# Patient Record
Sex: FEMALE | Race: WHITE | NOT HISPANIC OR LATINO | URBAN - METROPOLITAN AREA
[De-identification: names, ages, dates, MRNs, and addresses within clinical notes are randomized per-mention and may not be internally consistent; named-entity substitution may affect disease eponyms.]

---

## 2017-03-02 ENCOUNTER — OUTPATIENT (OUTPATIENT)
Dept: OUTPATIENT SERVICES | Facility: HOSPITAL | Age: 62
LOS: 1 days | End: 2017-03-02
Payer: COMMERCIAL

## 2017-03-02 DIAGNOSIS — R26.89 OTHER ABNORMALITIES OF GAIT AND MOBILITY: ICD-10-CM

## 2017-03-02 DIAGNOSIS — Z96.7 PRESENCE OF OTHER BONE AND TENDON IMPLANTS: Chronic | ICD-10-CM

## 2017-03-02 DIAGNOSIS — Z51.89 ENCOUNTER FOR OTHER SPECIFIED AFTERCARE: ICD-10-CM

## 2017-03-02 DIAGNOSIS — Z95.4 PRESENCE OF OTHER HEART-VALVE REPLACEMENT: Chronic | ICD-10-CM

## 2017-03-02 DIAGNOSIS — I69.354 HEMIPLEGIA AND HEMIPARESIS FOLLOWING CEREBRAL INFARCTION AFFECTING LEFT NON-DOMINANT SIDE: ICD-10-CM

## 2017-03-13 PROCEDURE — 97110 THERAPEUTIC EXERCISES: CPT

## 2017-03-13 PROCEDURE — 97116 GAIT TRAINING THERAPY: CPT

## 2017-03-13 PROCEDURE — 97530 THERAPEUTIC ACTIVITIES: CPT

## 2017-03-13 PROCEDURE — 97112 NEUROMUSCULAR REEDUCATION: CPT

## 2017-03-13 PROCEDURE — 97163 PT EVAL HIGH COMPLEX 45 MIN: CPT

## 2017-07-27 ENCOUNTER — APPOINTMENT (OUTPATIENT)
Dept: PULMONOLOGY | Facility: CLINIC | Age: 62
End: 2017-07-27
Payer: MEDICARE

## 2017-07-27 VITALS
SYSTOLIC BLOOD PRESSURE: 108 MMHG | RESPIRATION RATE: 16 BRPM | DIASTOLIC BLOOD PRESSURE: 72 MMHG | HEART RATE: 72 BPM | OXYGEN SATURATION: 93 %

## 2017-07-27 DIAGNOSIS — Z02.82 ENCOUNTER FOR ADOPTION SERVICES: ICD-10-CM

## 2017-07-27 DIAGNOSIS — Z86.79 PERSONAL HISTORY OF OTHER DISEASES OF THE CIRCULATORY SYSTEM: ICD-10-CM

## 2017-07-27 DIAGNOSIS — Z87.2 PERSONAL HISTORY OF DISEASES OF THE SKIN AND SUBCUTANEOUS TISSUE: ICD-10-CM

## 2017-07-27 DIAGNOSIS — Z86.73 PERSONAL HISTORY OF TRANSIENT ISCHEMIC ATTACK (TIA), AND CEREBRAL INFARCTION W/OUT RESIDUAL DEFICITS: ICD-10-CM

## 2017-07-27 DIAGNOSIS — Z86.59 PERSONAL HISTORY OF OTHER MENTAL AND BEHAVIORAL DISORDERS: ICD-10-CM

## 2017-07-27 DIAGNOSIS — Z87.891 PERSONAL HISTORY OF NICOTINE DEPENDENCE: ICD-10-CM

## 2017-07-27 DIAGNOSIS — G47.33 OBSTRUCTIVE SLEEP APNEA (ADULT) (PEDIATRIC): ICD-10-CM

## 2017-07-27 DIAGNOSIS — Z86.39 PERSONAL HISTORY OF OTHER ENDOCRINE, NUTRITIONAL AND METABOLIC DISEASE: ICD-10-CM

## 2017-07-27 PROCEDURE — 99204 OFFICE O/P NEW MOD 45 MIN: CPT

## 2017-07-27 RX ORDER — CITALOPRAM 10 MG/1
10 TABLET, FILM COATED ORAL
Refills: 0 | Status: ACTIVE | COMMUNITY

## 2017-07-27 RX ORDER — BUPROPION HYDROCHLORIDE 300 MG/1
300 TABLET, EXTENDED RELEASE ORAL
Refills: 0 | Status: ACTIVE | COMMUNITY

## 2017-08-15 ENCOUNTER — RECORD ABSTRACTING (OUTPATIENT)
Age: 62
End: 2017-08-15

## 2017-08-15 DIAGNOSIS — R53.1 WEAKNESS: ICD-10-CM

## 2017-08-15 DIAGNOSIS — R41.3 OTHER AMNESIA: ICD-10-CM

## 2017-08-29 ENCOUNTER — APPOINTMENT (OUTPATIENT)
Dept: NEUROLOGY | Facility: CLINIC | Age: 62
End: 2017-08-29
Payer: COMMERCIAL

## 2017-08-29 VITALS — WEIGHT: 195 LBS | HEIGHT: 66 IN | BODY MASS INDEX: 31.34 KG/M2

## 2017-08-29 VITALS
HEIGHT: 60 IN | SYSTOLIC BLOOD PRESSURE: 106 MMHG | BODY MASS INDEX: 28.27 KG/M2 | DIASTOLIC BLOOD PRESSURE: 67 MMHG | HEART RATE: 82 BPM | WEIGHT: 144 LBS

## 2017-08-29 PROCEDURE — 99213 OFFICE O/P EST LOW 20 MIN: CPT

## 2018-01-10 ENCOUNTER — APPOINTMENT (OUTPATIENT)
Dept: INTERNAL MEDICINE | Facility: CLINIC | Age: 63
End: 2018-01-10
Payer: COMMERCIAL

## 2018-01-10 PROCEDURE — 99203 OFFICE O/P NEW LOW 30 MIN: CPT

## 2018-04-06 ENCOUNTER — APPOINTMENT (OUTPATIENT)
Dept: NEUROLOGY | Facility: CLINIC | Age: 63
End: 2018-04-06
Payer: COMMERCIAL

## 2018-04-06 VITALS — HEIGHT: 66 IN | WEIGHT: 190 LBS | BODY MASS INDEX: 30.53 KG/M2

## 2018-04-06 PROCEDURE — 99214 OFFICE O/P EST MOD 30 MIN: CPT

## 2018-04-06 RX ORDER — DONEPEZIL HYDROCHLORIDE 5 MG/1
5 TABLET ORAL
Refills: 0 | Status: ACTIVE | COMMUNITY

## 2018-05-22 ENCOUNTER — APPOINTMENT (OUTPATIENT)
Dept: NEUROLOGY | Facility: CLINIC | Age: 63
End: 2018-05-22
Payer: COMMERCIAL

## 2018-05-22 VITALS
HEIGHT: 66 IN | WEIGHT: 180 LBS | BODY MASS INDEX: 28.93 KG/M2 | DIASTOLIC BLOOD PRESSURE: 74 MMHG | SYSTOLIC BLOOD PRESSURE: 122 MMHG

## 2018-05-22 PROCEDURE — 99214 OFFICE O/P EST MOD 30 MIN: CPT

## 2018-07-26 PROBLEM — Z86.73 HISTORY OF STROKE: Status: RESOLVED | Noted: 2017-07-27 | Resolved: 2018-07-26

## 2018-07-30 PROBLEM — Z87.2 HISTORY OF DECUBITUS ULCER: Status: RESOLVED | Noted: 2017-07-27 | Resolved: 2018-07-30

## 2018-08-21 ENCOUNTER — APPOINTMENT (OUTPATIENT)
Dept: NEUROLOGY | Facility: CLINIC | Age: 63
End: 2018-08-21
Payer: COMMERCIAL

## 2018-08-21 VITALS
SYSTOLIC BLOOD PRESSURE: 130 MMHG | WEIGHT: 190 LBS | DIASTOLIC BLOOD PRESSURE: 72 MMHG | BODY MASS INDEX: 30.53 KG/M2 | HEIGHT: 66 IN

## 2018-08-21 PROCEDURE — 99213 OFFICE O/P EST LOW 20 MIN: CPT

## 2018-08-21 RX ORDER — ISOSORBIDE MONONITRATE 120 MG/1
120 TABLET, EXTENDED RELEASE ORAL
Refills: 0 | Status: ACTIVE | COMMUNITY

## 2018-08-21 RX ORDER — LABETALOL HYDROCHLORIDE 300 MG/1
300 TABLET, FILM COATED ORAL
Refills: 0 | Status: ACTIVE | COMMUNITY

## 2018-08-21 RX ORDER — AMLODIPINE BESYLATE 2.5 MG/1
2.5 TABLET ORAL
Refills: 0 | Status: ACTIVE | COMMUNITY

## 2018-08-21 RX ORDER — LISINOPRIL 20 MG/1
20 TABLET ORAL
Refills: 0 | Status: ACTIVE | COMMUNITY

## 2018-08-21 RX ORDER — VALSARTAN 320 MG/1
320 TABLET, COATED ORAL
Refills: 0 | Status: ACTIVE | COMMUNITY

## 2019-03-26 ENCOUNTER — APPOINTMENT (OUTPATIENT)
Dept: NEUROLOGY | Facility: CLINIC | Age: 64
End: 2019-03-26
Payer: MEDICARE

## 2019-03-26 VITALS
HEIGHT: 66 IN | SYSTOLIC BLOOD PRESSURE: 116 MMHG | WEIGHT: 200 LBS | DIASTOLIC BLOOD PRESSURE: 80 MMHG | BODY MASS INDEX: 32.14 KG/M2

## 2019-03-26 PROCEDURE — 99213 OFFICE O/P EST LOW 20 MIN: CPT

## 2019-03-26 RX ORDER — RANITIDINE HYDROCHLORIDE 150 MG/1
150 TABLET, FILM COATED ORAL
Refills: 0 | Status: DISCONTINUED | COMMUNITY
End: 2019-03-26

## 2019-03-26 RX ORDER — ASPIRIN 325 MG/1
325 TABLET, FILM COATED ORAL
Refills: 0 | Status: DISCONTINUED | COMMUNITY
End: 2019-03-26

## 2019-03-26 RX ORDER — CLOPIDOGREL 75 MG/1
75 TABLET, FILM COATED ORAL
Refills: 0 | Status: DISCONTINUED | COMMUNITY
End: 2019-03-26

## 2019-03-26 RX ORDER — ASPIRIN 81 MG
81 TABLET, DELAYED RELEASE (ENTERIC COATED) ORAL
Refills: 0 | Status: DISCONTINUED | COMMUNITY
End: 2019-03-26

## 2019-03-26 NOTE — CONSULT LETTER
[Dear  ___] : Dear  [unfilled], [Courtesy Letter:] : I had the pleasure of seeing your patient, [unfilled], in my office today. [Please see my note below.] : Please see my note below. [Consult Closing:] : Thank you very much for allowing me to participate in the care of this patient.  If you have any questions, please do not hesitate to contact me. [Sincerely,] : Sincerely, [FreeTextEntry3] : Deep Torres M.D., Ph.D. DPN-N\par [facility]\par Neurology at [place]\par Medical Director of Stroke Services\par [facility]\par

## 2019-03-26 NOTE — PHYSICAL EXAM
[Person] : oriented to person [Place] : oriented to place [Time] : oriented to time [Remote Intact] : remote memory intact [Registration Intact] : recent registration memory intact [Span Intact] : the attention span was normal [Concentration Intact] : normal concentrating ability [Visual Intact] : visual attention was ~T not ~L decreased [Naming Objects] : no difficulty naming common objects [Repeating Phrases] : no difficulty repeating a phrase [Fluency] : fluency intact [Comprehension] : comprehension intact [Current Events] : adequate knowledge of current events [Past History] : adequate knowledge of personal past history [Cranial Nerves Optic (II)] : visual acuity intact bilaterally,  visual fields full to confrontation, pupils equal round and reactive to light [Cranial Nerves Oculomotor (III)] : extraocular motion intact [Cranial Nerves Trigeminal (V)] : facial sensation intact symmetrically [Cranial Nerves Facial (VII)] : face symmetrical [Cranial Nerves Vestibulocochlear (VIII)] : hearing was intact bilaterally [Cranial Nerves Glossopharyngeal (IX)] : tongue and palate midline [Cranial Nerves Accessory (XI - Cranial And Spinal)] : head turning and shoulder shrug symmetric [Cranial Nerves Hypoglossal (XII)] : there was no tongue deviation with protrusion [Cranial Nerves Facial Central - Left Only] : central 7th nerve weakness [Cranial Nerves Left Facial: House Grade ___(1-6)] : grade II (slight, 80%) facial nerve function [Motor Tone] : muscle tone was normal in all four extremities [Involuntary Movements] : no involuntary movements were seen [No Muscle Atrophy] : normal bulk in all four extremities [Motor Handedness Left-Handed] : the patient is left hand dominant [Hemiparesis Of Left Side] : hemiparesis was present on the left [Motor Strength Upper Extremities Right] : strength was normal in the right upper extremity [Motor Strength Upper Extremities Left] : there was weakness of the left upper extremity [Motor Strength Lower Extremities Right] : strength was normal in the right lower extremity [Motor Strength Lower Extremities Left] : there was weakness of the left lower extremity [Sensation Tactile Decrease] : light touch was intact [Sensation Pain / Temperature Decrease] : pain and temperature was intact [Sensation Vibration Decrease] : vibration was intact [Proprioception] : proprioception was intact [Non-ambulatory] : Non-ambulatory [Tremor] : no tremor present [Coordination - Dysmetria Impaired Finger-to-Nose Bilateral] : not present [1+] : Patella left 1+ [FreeTextEntry5] : dysarthria [FreeTextEntry6] : mild left alex-paresis arm greater than leg [FreeTextEntry8] : IN Wheelchair [Sclera] : the sclera and conjunctiva were normal [PERRL With Normal Accommodation] : pupils were equal in size, round, reactive to light, with normal accommodation [Extraocular Movements] : extraocular movements were intact [No APD] : no afferent pupillary defect [No KRISTIAN] : no internuclear ophthalmoplegia [Full Visual Field] : full visual field

## 2019-03-26 NOTE — HISTORY OF PRESENT ILLNESS
[FreeTextEntry1] : 8/29/17;\par This is a 62-year-old left-handed woman who comes in today for followup of stroke. This stroke left her with some left-sided weakness and expressive aphasia. She's not been to physical therapy for several months now. She generally is nonambulatory in her home but able to transfer from chair to wheelchair to couch. She is here today expressing renewed interested in doing physical therapy to become stronger. She is hoping to be able to drive again.\par \par Followup April 6, 2018:\par This is a 62-year-old woman returns for followup of stroke. She had a stroke in the past which left her with left-sided weakness mildly aphasia as well as dysarthria. Pressures in the hospital about 2 weeks ago and had another stroke seen on MRI in the posterior right frontal lobe. Her Lipitor was increased from 20 mg to 80 mg a day. She continued on antiplatelet agents. She states her blood pressure is under adequate control. She is here today for neurologic followup.\par \par Followup May 22, 2018:\par This is a 62-year-old woman who returns for followup of stroke. She has left hemiparesis and dysarthria as a result of her stroke. She can ambulate with a walker in the home but uses a wheelchair outside of the home. In March she had recurrent stroke. She apparently had question of PFO and was supposed to have a transesophageal echocardiogram. Currently she is not wish to have this test as she would not have surgery it was necessary based upon the results of the test. She is here today for neurologic followup and continues on medicine for blood pressure control, or hyperlipidemia control as well his antiplatelet agents for stroke.\par \par Followup August 21, 2018:\par This is a 63-year-old woman returns today for followup of her stroke. She has residual dysarthria and left hemiparesthesias. She is unchanged from stroke point of view. She has been a bit more impulsive after the sudden death of her sister. Other than that she's had no new issues. She is here today for routine neurologic followup.\par \par Followup March 26, 2019:\par This is a 63-year-old woman who presents today for followup of stroke. She has left-sided weakness and dysarthria. This is currently at her baseline. She is here with her brother who typically accompanies her to the office visits. There has not been any significant change in her neurologic or medical status since her last visit. She is here today for routine neurologic followup.

## 2019-03-26 NOTE — ASSESSMENT
[FreeTextEntry1] : This is a 63-year-old woman with history of stroke. She should continue on aspirin and blood pressure control. I will give her another prescription for physical therapy to see if she is a bit stronger. I will follow with her in 6 months, sooner should the need arise.

## 2019-04-09 ENCOUNTER — OUTPATIENT (OUTPATIENT)
Dept: OUTPATIENT SERVICES | Facility: HOSPITAL | Age: 64
LOS: 1 days | End: 2019-04-09
Payer: MEDICARE

## 2019-04-09 DIAGNOSIS — Z51.89 ENCOUNTER FOR OTHER SPECIFIED AFTERCARE: ICD-10-CM

## 2019-04-09 DIAGNOSIS — R27.9 UNSPECIFIED LACK OF COORDINATION: ICD-10-CM

## 2019-04-09 DIAGNOSIS — Z95.4 PRESENCE OF OTHER HEART-VALVE REPLACEMENT: Chronic | ICD-10-CM

## 2019-04-09 DIAGNOSIS — Z96.7 PRESENCE OF OTHER BONE AND TENDON IMPLANTS: Chronic | ICD-10-CM

## 2019-04-09 DIAGNOSIS — I63.9 CEREBRAL INFARCTION, UNSPECIFIED: ICD-10-CM

## 2019-04-09 DIAGNOSIS — R26.89 OTHER ABNORMALITIES OF GAIT AND MOBILITY: ICD-10-CM

## 2019-06-19 PROCEDURE — 97116 GAIT TRAINING THERAPY: CPT

## 2019-06-19 PROCEDURE — 97112 NEUROMUSCULAR REEDUCATION: CPT

## 2019-06-19 PROCEDURE — 97110 THERAPEUTIC EXERCISES: CPT

## 2019-06-19 PROCEDURE — 97163 PT EVAL HIGH COMPLEX 45 MIN: CPT

## 2019-10-01 ENCOUNTER — APPOINTMENT (OUTPATIENT)
Dept: NEUROLOGY | Facility: CLINIC | Age: 64
End: 2019-10-01
Payer: MEDICARE

## 2019-10-01 VITALS
BODY MASS INDEX: 30.7 KG/M2 | DIASTOLIC BLOOD PRESSURE: 70 MMHG | SYSTOLIC BLOOD PRESSURE: 120 MMHG | WEIGHT: 191 LBS | HEIGHT: 66 IN

## 2019-10-01 PROCEDURE — 99213 OFFICE O/P EST LOW 20 MIN: CPT

## 2019-10-01 NOTE — ASSESSMENT
[FreeTextEntry1] : This is a 64-year-old woman with stroke and residual left-sided weakness and dysarthria. She is having issues with the fourth and fifth digits of her left hand but is not realize it. This may be neglect. This may be a peripheral injury. I would like to do a EMG nerve conduction study to identify whether this is an ulnar neuropathy or cervical radiculopathy is not a likely is coming from the brain and her previous stroke. At this time however she is not want to undergo this test. I will see her back in 6 months, sooner should the need arise.

## 2019-10-01 NOTE — HISTORY OF PRESENT ILLNESS
[FreeTextEntry1] : 8/29/17;\par This is a 62-year-old left-handed woman who comes in today for followup of stroke. This stroke left her with some left-sided weakness and expressive aphasia. She's not been to physical therapy for several months now. She generally is nonambulatory in her home but able to transfer from chair to wheelchair to couch. She is here today expressing renewed interested in doing physical therapy to become stronger. She is hoping to be able to drive again.\par \par Followup April 6, 2018:\par This is a 62-year-old woman returns for followup of stroke. She had a stroke in the past which left her with left-sided weakness mildly aphasia as well as dysarthria. Pressures in the hospital about 2 weeks ago and had another stroke seen on MRI in the posterior right frontal lobe. Her Lipitor was increased from 20 mg to 80 mg a day. She continued on antiplatelet agents. She states her blood pressure is under adequate control. She is here today for neurologic followup.\par \par Followup May 22, 2018:\par This is a 62-year-old woman who returns for followup of stroke. She has left hemiparesis and dysarthria as a result of her stroke. She can ambulate with a walker in the home but uses a wheelchair outside of the home. In March she had recurrent stroke. She apparently had question of PFO and was supposed to have a transesophageal echocardiogram. Currently she is not wish to have this test as she would not have surgery it was necessary based upon the results of the test. She is here today for neurologic followup and continues on medicine for blood pressure control, or hyperlipidemia control as well his antiplatelet agents for stroke.\par \par Followup August 21, 2018:\par This is a 63-year-old woman returns today for followup of her stroke. She has residual dysarthria and left hemiparesthesias. She is unchanged from stroke point of view. She has been a bit more impulsive after the sudden death of her sister. Other than that she's had no new issues. She is here today for routine neurologic followup.\par \par Followup March 26, 2019:\par This is a 63-year-old woman who presents today for followup of stroke. She has left-sided weakness and dysarthria. This is currently at her baseline. She is here with her brother who typically accompanies her to the office visits. There has not been any significant change in her neurologic or medical status since her last visit. She is here today for routine neurologic followup.\par \par Followup October 1, 2019:\par This is a 63-year-old woman who presents today for followup of stroke. She has residual left-sided weakness and dysarthria. She is also having what appears to be sensory loss of the fourth and fifth digits of the left hand. Her brother is concerned because sometimes her rings will turn around and if she goes to grab something to stones will begin her hand. She did not feel it. She is otherwise unchanged from her previous visit. She is here today for neurologic followup.

## 2019-10-01 NOTE — CONSULT LETTER
[Dear  ___] : Dear  [unfilled], [Courtesy Letter:] : I had the pleasure of seeing your patient, [unfilled], in my office today. [Consult Closing:] : Thank you very much for allowing me to participate in the care of this patient.  If you have any questions, please do not hesitate to contact me. [Please see my note below.] : Please see my note below. [Sincerely,] : Sincerely, [FreeTextEntry3] : Deep Torres M.D., Ph.D. DPN-N\par [facility]\par Neurology at [place]\par Medical Director of Stroke Services\par [facility]\par

## 2019-10-01 NOTE — PHYSICAL EXAM
[Person] : oriented to person [Place] : oriented to place [Time] : oriented to time [Remote Intact] : remote memory intact [Registration Intact] : recent registration memory intact [Concentration Intact] : normal concentrating ability [Span Intact] : the attention span was normal [Naming Objects] : no difficulty naming common objects [Visual Intact] : visual attention was ~T not ~L decreased [Repeating Phrases] : no difficulty repeating a phrase [Fluency] : fluency intact [Comprehension] : comprehension intact [Past History] : adequate knowledge of personal past history [Current Events] : adequate knowledge of current events [Cranial Nerves Optic (II)] : visual acuity intact bilaterally,  visual fields full to confrontation, pupils equal round and reactive to light [Cranial Nerves Trigeminal (V)] : facial sensation intact symmetrically [Cranial Nerves Oculomotor (III)] : extraocular motion intact [Cranial Nerves Vestibulocochlear (VIII)] : hearing was intact bilaterally [Cranial Nerves Glossopharyngeal (IX)] : tongue and palate midline [Cranial Nerves Hypoglossal (XII)] : there was no tongue deviation with protrusion [Cranial Nerves Accessory (XI - Cranial And Spinal)] : head turning and shoulder shrug symmetric [Cranial Nerves Facial Central - Left Only] : central 7th nerve weakness [Cranial Nerves Left Facial: House Grade ___(1-6)] : grade II (slight, 80%) facial nerve function [Motor Tone] : muscle tone was normal in all four extremities [Involuntary Movements] : no involuntary movements were seen [No Muscle Atrophy] : normal bulk in all four extremities [Motor Handedness Left-Handed] : the patient is left hand dominant [Hemiparesis Of Left Side] : hemiparesis was present on the left [Motor Strength Upper Extremities Right] : strength was normal in the right upper extremity [Motor Strength Upper Extremities Left] : there was weakness of the left upper extremity [Motor Strength Lower Extremities Right] : strength was normal in the right lower extremity [Motor Strength Lower Extremities Left] : there was weakness of the left lower extremity [Sensation Tactile Decrease] : light touch was intact [Sensation Pain / Temperature Decrease] : pain and temperature was intact [Sensation Vibration Decrease] : vibration was intact [Proprioception] : proprioception was intact [Non-ambulatory] : Non-ambulatory [Tremor] : no tremor present [Coordination - Dysmetria Impaired Finger-to-Nose Bilateral] : not present [1+] : Patella right 1+ [FreeTextEntry5] : dysarthria [FreeTextEntry8] : IN Wheelchair [FreeTextEntry6] : mild left alex-paresis arm greater than leg [Sclera] : the sclera and conjunctiva were normal [PERRL With Normal Accommodation] : pupils were equal in size, round, reactive to light, with normal accommodation [No APD] : no afferent pupillary defect [Extraocular Movements] : extraocular movements were intact [No KRISTIAN] : no internuclear ophthalmoplegia [Full Visual Field] : full visual field

## 2020-04-03 ENCOUNTER — APPOINTMENT (OUTPATIENT)
Dept: NEUROLOGY | Facility: CLINIC | Age: 65
End: 2020-04-03
Payer: MEDICARE

## 2020-04-03 PROCEDURE — G2012 BRIEF CHECK IN BY MD/QHP: CPT

## 2021-08-24 ENCOUNTER — NON-APPOINTMENT (OUTPATIENT)
Age: 66
End: 2021-08-24

## 2021-09-22 ENCOUNTER — APPOINTMENT (OUTPATIENT)
Dept: NEUROLOGY | Facility: CLINIC | Age: 66
End: 2021-09-22
Payer: MEDICARE

## 2021-09-22 PROCEDURE — 99214 OFFICE O/P EST MOD 30 MIN: CPT

## 2021-09-22 NOTE — PHYSICAL EXAM
[Person] : oriented to person [Place] : oriented to place [Time] : oriented to time [Short Term Intact] : short term memory intact [Remote Intact] : remote memory intact [Registration Intact] : recent registration memory intact [Span Intact] : the attention span was normal [Concentration Intact] : normal concentrating ability [Visual Intact] : visual attention was ~T not ~L decreased [Naming Objects] : no difficulty naming common objects [Repeating Phrases] : no difficulty repeating a phrase [Fluency] : fluency intact [Comprehension] : comprehension intact [Current Events] : adequate knowledge of current events [Past History] : adequate knowledge of personal past history [Cranial Nerves Optic (II)] : visual acuity intact bilaterally,  visual fields full to confrontation, pupils equal round and reactive to light [Cranial Nerves Oculomotor (III)] : extraocular motion intact [Cranial Nerves Trigeminal (V)] : facial sensation intact symmetrically [Cranial Nerves Vestibulocochlear (VIII)] : hearing was intact bilaterally [Cranial Nerves Glossopharyngeal (IX)] : tongue and palate midline [Cranial Nerves Accessory (XI - Cranial And Spinal)] : head turning and shoulder shrug symmetric [Cranial Nerves Hypoglossal (XII)] : there was no tongue deviation with protrusion [Cranial Nerves Facial Central - Left Only] : central 7th nerve weakness [Cranial Nerves Left Facial: House Grade ___(1-6)] : grade II (slight, 80%) facial nerve function [Motor Tone] : muscle tone was normal in all four extremities [Involuntary Movements] : no involuntary movements were seen [No Muscle Atrophy] : normal bulk in all four extremities [Motor Handedness Left-Handed] : the patient is left hand dominant [Hemiparesis Of Left Side] : hemiparesis was present on the left [Motor Strength Upper Extremities Right] : strength was normal in the right upper extremity [Motor Strength Upper Extremities Left] : there was weakness of the left upper extremity [Motor Strength Lower Extremities Right] : strength was normal in the right lower extremity [Motor Strength Lower Extremities Left] : there was weakness of the left lower extremity [Sensation Tactile Decrease] : light touch was intact [Sensation Pain / Temperature Decrease] : pain and temperature was intact [Sensation Vibration Decrease] : vibration was intact [Proprioception] : proprioception was intact [Non-ambulatory] : Non-ambulatory [Tremor] : no tremor present [Coordination - Dysmetria Impaired Finger-to-Nose Bilateral] : not present [1+] : Patella left 1+ [FreeTextEntry4] : 2/3 recall, 3/3 with prompt [FreeTextEntry5] : dysarthria [FreeTextEntry6] : mild left alex-paresis arm greater than leg [FreeTextEntry8] : IN Wheelchair [Sclera] : the sclera and conjunctiva were normal [PERRL With Normal Accommodation] : pupils were equal in size, round, reactive to light, with normal accommodation [Extraocular Movements] : extraocular movements were intact [No APD] : no afferent pupillary defect [No KRISTIAN] : no internuclear ophthalmoplegia [Full Visual Field] : full visual field

## 2021-09-22 NOTE — ASSESSMENT
[FreeTextEntry1] : This is a 66-year-old woman with history of strokes. She has had recent hospital visit for altered mental status which was likely due to urinary tract infection which is improved. She does have some mild continuing memory loss it's been going on over the past several months. Based on her CT findings this may be early stages of vascular dementia. At this time I would not start any medication. I will see her back in 3 months for reassessment.

## 2021-09-22 NOTE — DATA REVIEWED
[de-identified] : Report of the head CT that was done August 20, 2021 at the [facility]. There is no acute stroke mass or bleed. There was evidence for severe diffuse white matter changes and numerous chronic infarcts.

## 2021-09-22 NOTE — CONSULT LETTER
[Dear  ___] : Dear  [unfilled], [Courtesy Letter:] : I had the pleasure of seeing your patient, [unfilled], in my office today. [Please see my note below.] : Please see my note below. [Consult Closing:] : Thank you very much for allowing me to participate in the care of this patient.  If you have any questions, please do not hesitate to contact me. [Sincerely,] : Sincerely, [FreeTextEntry3] : Deep Torres M.D., Ph.D. DPN-N\par [facility]\par Neurology at [place]\par Medical Director of Stroke Services\par [facility]\par

## 2021-09-22 NOTE — HISTORY OF PRESENT ILLNESS
[FreeTextEntry1] : 8/29/17;\par This is a 62-year-old left-handed woman who comes in today for followup of stroke. This stroke left her with some left-sided weakness and expressive aphasia. She's not been to physical therapy for several months now. She generally is nonambulatory in her home but able to transfer from chair to wheelchair to couch. She is here today expressing renewed interested in doing physical therapy to become stronger. She is hoping to be able to drive again.\par \par Followup April 6, 2018:\par This is a 62-year-old woman returns for followup of stroke. She had a stroke in the past which left her with left-sided weakness mildly aphasia as well as dysarthria. Pressures in the hospital about 2 weeks ago and had another stroke seen on MRI in the posterior right frontal lobe. Her Lipitor was increased from 20 mg to 80 mg a day. She continued on antiplatelet agents. She states her blood pressure is under adequate control. She is here today for neurologic followup.\par \par Followup May 22, 2018:\par This is a 62-year-old woman who returns for followup of stroke. She has left hemiparesis and dysarthria as a result of her stroke. She can ambulate with a walker in the home but uses a wheelchair outside of the home. In March she had recurrent stroke. She apparently had question of PFO and was supposed to have a transesophageal echocardiogram. Currently she is not wish to have this test as she would not have surgery it was necessary based upon the results of the test. She is here today for neurologic followup and continues on medicine for blood pressure control, or hyperlipidemia control as well his antiplatelet agents for stroke.\par \par Followup August 21, 2018:\par This is a 63-year-old woman returns today for followup of her stroke. She has residual dysarthria and left hemiparesthesias. She is unchanged from stroke point of view. She has been a bit more impulsive after the sudden death of her sister. Other than that she's had no new issues. She is here today for routine neurologic followup.\par \par Followup March 26, 2019:\par This is a 63-year-old woman who presents today for followup of stroke. She has left-sided weakness and dysarthria. This is currently at her baseline. She is here with her brother who typically accompanies her to the office visits. There has not been any significant change in her neurologic or medical status since her last visit. She is here today for routine neurologic followup.\par \par Followup October 1, 2019:\par This is a 63-year-old woman who presents today for followup of stroke. She has residual left-sided weakness and dysarthria. She is also having what appears to be sensory loss of the fourth and fifth digits of the left hand. Her brother is concerned because sometimes her rings will turn around and if she goes to grab something to stones will begin her hand. She did not feel it. She is otherwise unchanged from her previous visit. She is here today for neurologic followup.\par \par Followup September 22, 2021:\par This is a 66-year-old woman who presents today for neurologic followup. She's had history of stroke the past his left Her with residualdysarthria and residual left-sided weakness. Last month she had episode of confusion where she couldn't recognize people. The family took her to [facility] where workup revealed no acute findings on head CT and a urinary tract infection which was treated. Her brother states that she's been having a mild short term memory issues. She is no longer experiencing altered mental status. She is here today for neurologic evaluation.

## 2021-12-14 ENCOUNTER — APPOINTMENT (OUTPATIENT)
Dept: NEUROLOGY | Facility: CLINIC | Age: 66
End: 2021-12-14
Payer: MEDICARE

## 2021-12-14 VITALS
BODY MASS INDEX: 32.14 KG/M2 | DIASTOLIC BLOOD PRESSURE: 60 MMHG | SYSTOLIC BLOOD PRESSURE: 90 MMHG | WEIGHT: 200 LBS | HEIGHT: 66 IN

## 2021-12-14 DIAGNOSIS — I10 ESSENTIAL (PRIMARY) HYPERTENSION: ICD-10-CM

## 2021-12-14 PROCEDURE — 99214 OFFICE O/P EST MOD 30 MIN: CPT

## 2021-12-14 NOTE — CONSULT LETTER
[Dear  ___] : Dear  [unfilled], [Courtesy Letter:] : I had the pleasure of seeing your patient, [unfilled], in my office today. [Please see my note below.] : Please see my note below. [Consult Closing:] : Thank you very much for allowing me to participate in the care of this patient.  If you have any questions, please do not hesitate to contact me. [Sincerely,] : Sincerely, [FreeTextEntry3] : Deep Torres M.D., Ph.D. DPN-N\par [facility]\par Neurology at [place]\par Medical Director of Stroke Services\par [facility]\par

## 2021-12-14 NOTE — PHYSICAL EXAM
[Person] : oriented to person [Place] : oriented to place [Time] : oriented to time [Remote Intact] : remote memory intact [Registration Intact] : recent registration memory intact [Span Intact] : the attention span was normal [Concentration Intact] : normal concentrating ability [Visual Intact] : visual attention was ~T not ~L decreased [Naming Objects] : no difficulty naming common objects [Repeating Phrases] : no difficulty repeating a phrase [Fluency] : fluency intact [Comprehension] : comprehension intact [Current Events] : adequate knowledge of current events [Past History] : adequate knowledge of personal past history [Cranial Nerves Optic (II)] : visual acuity intact bilaterally,  visual fields full to confrontation, pupils equal round and reactive to light [Cranial Nerves Oculomotor (III)] : extraocular motion intact [Cranial Nerves Trigeminal (V)] : facial sensation intact symmetrically [Cranial Nerves Vestibulocochlear (VIII)] : hearing was intact bilaterally [Cranial Nerves Glossopharyngeal (IX)] : tongue and palate midline [Cranial Nerves Accessory (XI - Cranial And Spinal)] : head turning and shoulder shrug symmetric [Cranial Nerves Hypoglossal (XII)] : there was no tongue deviation with protrusion [Cranial Nerves Facial Central - Left Only] : central 7th nerve weakness [Cranial Nerves Left Facial: House Grade ___(1-6)] : grade II (slight, 80%) facial nerve function [Motor Tone] : muscle tone was normal in all four extremities [Involuntary Movements] : no involuntary movements were seen [No Muscle Atrophy] : normal bulk in all four extremities [Motor Handedness Left-Handed] : the patient is left hand dominant [Hemiparesis Of Left Side] : hemiparesis was present on the left [Motor Strength Upper Extremities Right] : strength was normal in the right upper extremity [Motor Strength Upper Extremities Left] : there was weakness of the left upper extremity [Motor Strength Lower Extremities Right] : strength was normal in the right lower extremity [Motor Strength Lower Extremities Left] : there was weakness of the left lower extremity [Sensation Tactile Decrease] : light touch was intact [Sensation Pain / Temperature Decrease] : pain and temperature was intact [Sensation Vibration Decrease] : vibration was intact [Proprioception] : proprioception was intact [Non-ambulatory] : Non-ambulatory [Tremor] : no tremor present [Coordination - Dysmetria Impaired Finger-to-Nose Bilateral] : not present [1+] : Patella left 1+ [FreeTextEntry5] : dysarthria [FreeTextEntry6] : mild left alex-paresis arm greater than leg [FreeTextEntry8] : IN Wheelchair [Sclera] : the sclera and conjunctiva were normal [PERRL With Normal Accommodation] : pupils were equal in size, round, reactive to light, with normal accommodation [Extraocular Movements] : extraocular movements were intact [No APD] : no afferent pupillary defect [No KRISTIAN] : no internuclear ophthalmoplegia [Full Visual Field] : full visual field

## 2021-12-14 NOTE — HISTORY OF PRESENT ILLNESS
[FreeTextEntry1] : 8/29/17;\par This is a 62-year-old left-handed woman who comes in today for followup of stroke. This stroke left her with some left-sided weakness and expressive aphasia. She's not been to physical therapy for several months now. She generally is nonambulatory in her home but able to transfer from chair to wheelchair to couch. She is here today expressing renewed interested in doing physical therapy to become stronger. She is hoping to be able to drive again.\par \par Followup April 6, 2018:\par This is a 62-year-old woman returns for followup of stroke. She had a stroke in the past which left her with left-sided weakness mildly aphasia as well as dysarthria. Pressures in the hospital about 2 weeks ago and had another stroke seen on MRI in the posterior right frontal lobe. Her Lipitor was increased from 20 mg to 80 mg a day. She continued on antiplatelet agents. She states her blood pressure is under adequate control. She is here today for neurologic followup.\par \par Followup May 22, 2018:\par This is a 62-year-old woman who returns for followup of stroke. She has left hemiparesis and dysarthria as a result of her stroke. She can ambulate with a walker in the home but uses a wheelchair outside of the home. In March she had recurrent stroke. She apparently had question of PFO and was supposed to have a transesophageal echocardiogram. Currently she is not wish to have this test as she would not have surgery it was necessary based upon the results of the test. She is here today for neurologic followup and continues on medicine for blood pressure control, or hyperlipidemia control as well his antiplatelet agents for stroke.\par \par Followup August 21, 2018:\par This is a 63-year-old woman returns today for followup of her stroke. She has residual dysarthria and left hemiparesthesias. She is unchanged from stroke point of view. She has been a bit more impulsive after the sudden death of her sister. Other than that she's had no new issues. She is here today for routine neurologic followup.\par \par Followup March 26, 2019:\par This is a 63-year-old woman who presents today for followup of stroke. She has left-sided weakness and dysarthria. This is currently at her baseline. She is here with her brother who typically accompanies her to the office visits. There has not been any significant change in her neurologic or medical status since her last visit. She is here today for routine neurologic followup.\par \par Followup October 1, 2019:\par This is a 63-year-old woman who presents today for followup of stroke. She has residual left-sided weakness and dysarthria. She is also having what appears to be sensory loss of the fourth and fifth digits of the left hand. Her brother is concerned because sometimes her rings will turn around and if she goes to grab something to stones will begin her hand. She did not feel it. She is otherwise unchanged from her previous visit. She is here today for neurologic followup.\par \par Followup September 22, 2021:\par This is a 66-year-old woman who presents today for neurologic followup. She's had history of stroke the past his left Her with residual dysarthria and residual left-sided weakness. Last month she had episode of confusion where she couldn't recognize people. The family took her to [facility] where workup revealed no acute findings on head CT and a urinary tract infection which was treated. Her brother states that she's been having a mild short term memory issues. She is no longer experiencing altered mental status. She is here today for neurologic evaluation.\par \par Followup December 14, 2021::\par This is a 66-year-old woman who presents today for neurologic followup. She has a history of stroke that has left her with residual dysarthria. She also has mild left-sided weakness. She at last visit was complaining of memory issues. She finds that this is stable and her brother agrees. She is currently taking a daily aspirin, 325 mg. She is keeping control her blood pressure and cholesterol. She is here today for neurologic followup.

## 2021-12-14 NOTE — ASSESSMENT
[FreeTextEntry1] : This is a 66-year-old woman with history of stroke. At this time she should continue with daily aspirin as well as controlling her blood pressure and cholesterol. She may have early vascular dementia and efforts to event further ischemic injury will also assist with this. I will see her back in 3 months, sooner should the need arise.

## 2021-12-14 NOTE — REVIEW OF SYSTEMS
[As Noted in HPI] : as noted in HPI [Arm Weakness] : no arm weakness [Hand Weakness] : no hand weakness [Leg Weakness] : no leg weakness [Difficulties in Speech] : speech difficulties [Difficulty Walking] : difficulty walking [Negative] : Heme/Lymph

## 2022-03-15 ENCOUNTER — APPOINTMENT (OUTPATIENT)
Dept: NEUROLOGY | Facility: CLINIC | Age: 67
End: 2022-03-15
Payer: MEDICARE

## 2022-03-15 VITALS
HEIGHT: 66 IN | WEIGHT: 195 LBS | BODY MASS INDEX: 31.34 KG/M2 | DIASTOLIC BLOOD PRESSURE: 70 MMHG | SYSTOLIC BLOOD PRESSURE: 110 MMHG

## 2022-03-15 PROCEDURE — 99214 OFFICE O/P EST MOD 30 MIN: CPT

## 2022-03-15 NOTE — ASSESSMENT
[FreeTextEntry1] : This is a 66-year-old woman status post stroke which is left her with some dysarthria and mild left-sided weakness.  From this she is stable.  I would suggest to continue aspirin as well as a keep her blood pressure within normal parameters with medication and keep her LDL under 70 with the Lipitor.  I regarding her memory is fairly stable.  This may represent a vascular dementia and as she addresses for secondary stroke prevention she will also be preventing worsening of her condition.  I will see her back in 6 months, sooner should the need arise.

## 2022-03-15 NOTE — CONSULT LETTER
[Dear  ___] : Dear  [unfilled], [Courtesy Letter:] : I had the pleasure of seeing your patient, [unfilled], in my office today. [Please see my note below.] : Please see my note below. [Consult Closing:] : Thank you very much for allowing me to participate in the care of this patient.  If you have any questions, please do not hesitate to contact me. [Sincerely,] : Sincerely, [FreeTextEntry3] : Deep Torres M.D., Ph.D. DPN-N\par [facility]\par Neurology at [place]\par Medical Director of Stroke Services\par [facility]\par

## 2022-03-15 NOTE — REVIEW OF SYSTEMS
[As Noted in HPI] : as noted in HPI [Memory Lapses or Loss] : memory loss [Difficulties in Speech] : speech difficulties [Negative] : Heme/Lymph

## 2023-01-13 ENCOUNTER — APPOINTMENT (OUTPATIENT)
Dept: NEUROLOGY | Facility: CLINIC | Age: 68
End: 2023-01-13
Payer: MEDICARE

## 2023-01-13 VITALS
SYSTOLIC BLOOD PRESSURE: 114 MMHG | DIASTOLIC BLOOD PRESSURE: 74 MMHG | WEIGHT: 195 LBS | HEIGHT: 66 IN | BODY MASS INDEX: 31.34 KG/M2

## 2023-01-13 DIAGNOSIS — E78.5 HYPERLIPIDEMIA, UNSPECIFIED: ICD-10-CM

## 2023-01-13 PROCEDURE — 99213 OFFICE O/P EST LOW 20 MIN: CPT

## 2023-01-13 NOTE — HISTORY OF PRESENT ILLNESS
[FreeTextEntry1] : 8/29/17;\par This is a 62-year-old left-handed woman who comes in today for followup of stroke. This stroke left her with some left-sided weakness and expressive aphasia. She's not been to physical therapy for several months now. She generally is nonambulatory in her home but able to transfer from chair to wheelchair to couch. She is here today expressing renewed interested in doing physical therapy to become stronger. She is hoping to be able to drive again.\par \par Followup April 6, 2018:\par This is a 62-year-old woman returns for followup of stroke. She had a stroke in the past which left her with left-sided weakness mildly aphasia as well as dysarthria. Pressures in the hospital about 2 weeks ago and had another stroke seen on MRI in the posterior right frontal lobe. Her Lipitor was increased from 20 mg to 80 mg a day. She continued on antiplatelet agents. She states her blood pressure is under adequate control. She is here today for neurologic followup.\par \par Followup May 22, 2018:\par This is a 62-year-old woman who returns for followup of stroke. She has left hemiparesis and dysarthria as a result of her stroke. She can ambulate with a walker in the home but uses a wheelchair outside of the home. In March she had recurrent stroke. She apparently had question of PFO and was supposed to have a transesophageal echocardiogram. Currently she is not wish to have this test as she would not have surgery it was necessary based upon the results of the test. She is here today for neurologic followup and continues on medicine for blood pressure control, or hyperlipidemia control as well his antiplatelet agents for stroke.\par \par Followup August 21, 2018:\par This is a 63-year-old woman returns today for followup of her stroke. She has residual dysarthria and left hemiparesthesias. She is unchanged from stroke point of view. She has been a bit more impulsive after the sudden death of her sister. Other than that she's had no new issues. She is here today for routine neurologic followup.\par \par Followup March 26, 2019:\par This is a 63-year-old woman who presents today for followup of stroke. She has left-sided weakness and dysarthria. This is currently at her baseline. She is here with her brother who typically accompanies her to the office visits. There has not been any significant change in her neurologic or medical status since her last visit. She is here today for routine neurologic followup.\par \par Followup October 1, 2019:\par This is a 63-year-old woman who presents today for followup of stroke. She has residual left-sided weakness and dysarthria. She is also having what appears to be sensory loss of the fourth and fifth digits of the left hand. Her brother is concerned because sometimes her rings will turn around and if she goes to grab something to stones will begin her hand. She did not feel it. She is otherwise unchanged from her previous visit. She is here today for neurologic followup.\par \par Followup September 22, 2021:\par This is a 66-year-old woman who presents today for neurologic followup. She's had history of stroke the past his left Her with residual dysarthria and residual left-sided weakness. Last month she had episode of confusion where she couldn't recognize people. The family took her to [facility] where workup revealed no acute findings on head CT and a urinary tract infection which was treated. Her brother states that she's been having a mild short term memory issues. She is no longer experiencing altered mental status. She is here today for neurologic evaluation.\par \par Followup December 14, 2021::\par This is a 66-year-old woman who presents today for neurologic followup. She has a history of stroke that has left her with residual dysarthria. She also has mild left-sided weakness. She at last visit was complaining of memory issues. She finds that this is stable and her brother agrees. She is currently taking a daily aspirin, 325 mg. She is keeping control her blood pressure and cholesterol. She is here today for neurologic followup.\par \par Follow-up March 15, 2022:\par This is a 66-year-old woman who presents today for follow-up of stroke as well as memory loss.  Regarding her stroke she is fairly stable she has some dysarthria and mild left-sided weakness which is at her baseline.  She also was complaining of memory loss.  This is stable.  This may represent more of a vascular issue rather than a primary neurodegenerative dementia.  She is here today routine neurologic follow-up, remaining on medications for blood pressure cholesterol as well as aspirin.\par \par Follow-up January 13, 2023:\par This is a 67-year-old woman who presents today with history of stroke.  She also has some mild memory loss and expressive language issues.  She has a left-sided weakness and dysarthria as a result of her stroke.  She is stable from this.  She has more issues today with expressing herself.  Time she feels she cannot get the words out right.  She is here today for neurologic follow-up.\par

## 2023-01-13 NOTE — CONSULT LETTER
[Dear  ___] : Dear  [unfilled], [Courtesy Letter:] : I had the pleasure of seeing your patient, [unfilled], in my office today. [Please see my note below.] : Please see my note below. [Consult Closing:] : Thank you very much for allowing me to participate in the care of this patient.  If you have any questions, please do not hesitate to contact me. [Sincerely,] : Sincerely, [FreeTextEntry3] : Deep Torres M.D., Ph.D. DPN-N\par [facility]\par Neurology at [place]\par Medical Director of Stroke Services\par [facility]\par

## 2023-01-13 NOTE — ASSESSMENT
[FreeTextEntry1] : This is a 67-year-old woman with history of stroke.  At this time I would continue medications to address her stroke risk factors keep her blood pressure normal her LDL under 70 and she should continue antiplatelet agent.  I will send her for speech therapy to see if he can help with some of her expressive issues.  I will see her back in 6 months, sooner should the need arise. left upper arm

## 2023-09-18 ENCOUNTER — OFFICE (OUTPATIENT)
Dept: URBAN - METROPOLITAN AREA CLINIC 104 | Facility: CLINIC | Age: 68
Setting detail: OPHTHALMOLOGY
End: 2023-09-18
Payer: MEDICARE

## 2023-09-18 DIAGNOSIS — H01.002: ICD-10-CM

## 2023-09-18 DIAGNOSIS — H01.001: ICD-10-CM

## 2023-09-18 DIAGNOSIS — H43.393: ICD-10-CM

## 2023-09-18 DIAGNOSIS — H25.13: ICD-10-CM

## 2023-09-18 DIAGNOSIS — H01.005: ICD-10-CM

## 2023-09-18 DIAGNOSIS — E11.9: ICD-10-CM

## 2023-09-18 DIAGNOSIS — H01.004: ICD-10-CM

## 2023-09-18 PROCEDURE — 92014 COMPRE OPH EXAM EST PT 1/>: CPT | Performed by: OPHTHALMOLOGY

## 2023-09-18 ASSESSMENT — REFRACTION_CURRENTRX
OS_SPHERE: -3.50
OD_ADD: +2.25
OS_OVR_VA: 20/
OS_CYLINDER: -0.75
OS_ADD: +2.25
OS_AXIS: 172
OD_CYLINDER: -0.50
OD_AXIS: 5
OD_OVR_VA: 20/
OD_SPHERE: -3.75

## 2023-09-18 ASSESSMENT — REFRACTION_MANIFEST
OS_VA1: 20/40
OD_SPHERE: -4.00
OS_CYLINDER: -0.50
OD_CYLINDER: -0.50
OD_AXIS: 5
OS_SPHERE: -4.00
OS_AXIS: 165
OD_VA1: 20/40

## 2023-09-18 ASSESSMENT — LID EXAM ASSESSMENTS
OS_BLEPHARITIS: LLL LUL 1+
OD_BLEPHARITIS: RLL RUL 1+

## 2023-09-18 ASSESSMENT — KERATOMETRY
OS_K1POWER_DIOPTERS: 43.38
OD_K2POWER_DIOPTERS: 44.41
OS_K2POWER_DIOPTERS: 44.82
OD_K1POWER_DIOPTERS: 43.77
OS_AXISANGLE_DEGREES: 95
OD_AXISANGLE_DEGREES: 106

## 2023-09-18 ASSESSMENT — SPHEQUIV_DERIVED
OD_SPHEQUIV: -3.75
OS_SPHEQUIV: -4.25
OS_SPHEQUIV: -4.25
OD_SPHEQUIV: -4.25

## 2023-09-18 ASSESSMENT — VISUAL ACUITY
OD_BCVA: 20/40
OS_BCVA: 20/40

## 2023-09-18 ASSESSMENT — REFRACTION_AUTOREFRACTION
OD_SPHERE: -3.25
OD_AXIS: 80
OD_CYLINDER: -1.00
OS_SPHERE: -4.00
OS_AXIS: 166
OS_CYLINDER: -0.50

## 2023-09-18 ASSESSMENT — TONOMETRY
OS_IOP_MMHG: 15
OD_IOP_MMHG: 15

## 2023-09-18 ASSESSMENT — CONFRONTATIONAL VISUAL FIELD TEST (CVF)
OD_FINDINGS: FULL
OS_FINDINGS: FULL

## 2023-09-18 ASSESSMENT — AXIALLENGTH_DERIVED
OD_AL: 24.91
OD_AL: 25.12
OS_AL: 25.12
OS_AL: 25.12

## 2023-10-04 ENCOUNTER — APPOINTMENT (OUTPATIENT)
Dept: NEUROLOGY | Facility: CLINIC | Age: 68
End: 2023-10-04
Payer: MEDICARE

## 2023-10-04 VITALS — WEIGHT: 195 LBS | HEIGHT: 66 IN | BODY MASS INDEX: 31.34 KG/M2

## 2023-10-04 PROCEDURE — 99213 OFFICE O/P EST LOW 20 MIN: CPT

## 2023-10-04 RX ORDER — ASPIRIN 325 MG/1
325 TABLET, FILM COATED ORAL
Refills: 0 | Status: DISCONTINUED | COMMUNITY
End: 2023-10-04

## 2023-10-04 RX ADMIN — ASPIRIN 1 MG: 81 TABLET, COATED ORAL at 00:00

## 2024-04-04 ENCOUNTER — APPOINTMENT (OUTPATIENT)
Dept: NEUROLOGY | Facility: CLINIC | Age: 69
End: 2024-04-04
Payer: MEDICARE

## 2024-04-04 VITALS
DIASTOLIC BLOOD PRESSURE: 74 MMHG | BODY MASS INDEX: 31.34 KG/M2 | WEIGHT: 195 LBS | SYSTOLIC BLOOD PRESSURE: 110 MMHG | HEIGHT: 66 IN

## 2024-04-04 DIAGNOSIS — I63.9 CEREBRAL INFARCTION, UNSPECIFIED: ICD-10-CM

## 2024-04-04 DIAGNOSIS — G31.84 MILD COGNITIVE IMPAIRMENT, SO STATED: ICD-10-CM

## 2024-04-04 PROCEDURE — G2211 COMPLEX E/M VISIT ADD ON: CPT

## 2024-04-04 PROCEDURE — 99213 OFFICE O/P EST LOW 20 MIN: CPT

## 2024-04-04 RX ORDER — ATORVASTATIN CALCIUM 80 MG/1
80 TABLET, FILM COATED ORAL
Refills: 0 | Status: ACTIVE | COMMUNITY

## 2024-04-04 NOTE — REVIEW OF SYSTEMS
[As Noted in HPI] : as noted in HPI [Memory Lapses or Loss] : memory loss [Difficulty with Language] : ~M difficulty with language [Facial Weakness] : facial weakness [Arm Weakness] : arm weakness [Hand Weakness] :  hand weakness [Leg Weakness] : leg weakness [Difficulty Walking] : difficulty walking [Negative] : Heme/Lymph

## 2024-04-04 NOTE — ASSESSMENT
[FreeTextEntry1] : This is a 68-year-old woman with history of stroke several years ago.  She has a stable left hemiparesis with mostly dysarthria and slight aphasia.  She does continue have some mild confusion but not to the point where she would need medication adjustment.  She should continue to take daily baby aspirin as well as statin to keep her LDL under 70.  She should keep her blood pressure in normal range and keep her hemoglobin A1c under 6.5%.   I will see her back in 6 months for continued care secondary risk factor modification for stroke.

## 2024-04-04 NOTE — PHYSICAL EXAM
[Person] : oriented to person [Place] : oriented to place [Time] : oriented to time [Remote Intact] : remote memory intact [Registration Intact] : recent registration memory intact [Span Intact] : the attention span was normal [Concentration Intact] : normal concentrating ability [Visual Intact] : visual attention was ~T not ~L decreased [Naming Objects] : no difficulty naming common objects [Repeating Phrases] : no difficulty repeating a phrase [Fluency] : fluency not intact [Comprehension] : comprehension intact [Current Events] : adequate knowledge of current events [Past History] : adequate knowledge of personal past history [Cranial Nerves Optic (II)] : visual acuity intact bilaterally,  visual fields full to confrontation, pupils equal round and reactive to light [Cranial Nerves Oculomotor (III)] : extraocular motion intact [Cranial Nerves Trigeminal (V)] : facial sensation intact symmetrically [Cranial Nerves Vestibulocochlear (VIII)] : hearing was intact bilaterally [Cranial Nerves Glossopharyngeal (IX)] : tongue and palate midline [Cranial Nerves Accessory (XI - Cranial And Spinal)] : head turning and shoulder shrug symmetric [Cranial Nerves Hypoglossal (XII)] : there was no tongue deviation with protrusion [Cranial Nerves Facial Central - Left Only] : central 7th nerve weakness [Cranial Nerves Left Facial: House Grade ___(1-6)] : grade II (slight, 80%) facial nerve function [Motor Tone] : muscle tone was normal in all four extremities [Involuntary Movements] : no involuntary movements were seen [No Muscle Atrophy] : normal bulk in all four extremities [Motor Handedness Left-Handed] : the patient is left hand dominant [Hemiparesis Of Left Side] : hemiparesis was present on the left [Motor Strength Upper Extremities Right] : strength was normal in the right upper extremity [Motor Strength Upper Extremities Left] : there was weakness of the left upper extremity [Motor Strength Lower Extremities Right] : strength was normal in the right lower extremity [Motor Strength Lower Extremities Left] : there was weakness of the left lower extremity [Sensation Tactile Decrease] : light touch was intact [Sensation Pain / Temperature Decrease] : pain and temperature was intact [Sensation Vibration Decrease] : vibration was intact [Proprioception] : proprioception was intact [Non-ambulatory] : Non-ambulatory [Tremor] : no tremor present [Coordination - Dysmetria Impaired Finger-to-Nose Bilateral] : not present [1+] : Patella left 1+ [FreeTextEntry5] : dysarthria [FreeTextEntry6] : mild left alex-paresis arm greater than leg [FreeTextEntry8] : IN Wheelchair, does not ambulate in office [Sclera] : the sclera and conjunctiva were normal [PERRL With Normal Accommodation] : pupils were equal in size, round, reactive to light, with normal accommodation [Extraocular Movements] : extraocular movements were intact [No APD] : no afferent pupillary defect [No KRISTIAN] : no internuclear ophthalmoplegia [Full Visual Field] : full visual field

## 2024-04-04 NOTE — CONSULT LETTER
[Dear  ___] : Dear  [unfilled], [Courtesy Letter:] : I had the pleasure of seeing your patient, [unfilled], in my office today. [Please see my note below.] : Please see my note below. [Consult Closing:] : Thank you very much for allowing me to participate in the care of this patient.  If you have any questions, please do not hesitate to contact me. [Sincerely,] : Sincerely, [FreeTextEntry3] : Deep Torres M.D., Ph.D. DPN-N [facility] Neurology at [place] Director, Division of Neurology Director, [facility]

## 2024-04-04 NOTE — HISTORY OF PRESENT ILLNESS
[FreeTextEntry1] : 8/29/17; This is a 62-year-old left-handed woman who comes in today for followup of stroke. This stroke left her with some left-sided weakness and expressive aphasia. She's not been to physical therapy for several months now. She generally is nonambulatory in her home but able to transfer from chair to wheelchair to couch. She is here today expressing renewed interested in doing physical therapy to become stronger. She is hoping to be able to drive again.  Followup April 6, 2018: This is a 62-year-old woman returns for followup of stroke. She had a stroke in the past which left her with left-sided weakness mildly aphasia as well as dysarthria. Pressures in the hospital about 2 weeks ago and had another stroke seen on MRI in the posterior right frontal lobe. Her Lipitor was increased from 20 mg to 80 mg a day. She continued on antiplatelet agents. She states her blood pressure is under adequate control. She is here today for neurologic followup.  Followup May 22, 2018: This is a 62-year-old woman who returns for followup of stroke. She has left hemiparesis and dysarthria as a result of her stroke. She can ambulate with a walker in the home but uses a wheelchair outside of the home. In March she had recurrent stroke. She apparently had question of PFO and was supposed to have a transesophageal echocardiogram. Currently she is not wish to have this test as she would not have surgery it was necessary based upon the results of the test. She is here today for neurologic followup and continues on medicine for blood pressure control, or hyperlipidemia control as well his antiplatelet agents for stroke.  Followup August 21, 2018: This is a 63-year-old woman returns today for followup of her stroke. She has residual dysarthria and left hemiparesthesias. She is unchanged from stroke point of view. She has been a bit more impulsive after the sudden death of her sister. Other than that she's had no new issues. She is here today for routine neurologic followup.  Followup March 26, 2019: This is a 63-year-old woman who presents today for followup of stroke. She has left-sided weakness and dysarthria. This is currently at her baseline. She is here with her brother who typically accompanies her to the office visits. There has not been any significant change in her neurologic or medical status since her last visit. She is here today for routine neurologic followup.  Followup October 1, 2019: This is a 63-year-old woman who presents today for followup of stroke. She has residual left-sided weakness and dysarthria. She is also having what appears to be sensory loss of the fourth and fifth digits of the left hand. Her brother is concerned because sometimes her rings will turn around and if she goes to grab something to stones will begin her hand. She did not feel it. She is otherwise unchanged from her previous visit. She is here today for neurologic followup.  Followup September 22, 2021: This is a 66-year-old woman who presents today for neurologic followup. She's had history of stroke the past his left Her with residual dysarthria and residual left-sided weakness. Last month she had episode of confusion where she couldn't recognize people. The family took her to [facility] where workup revealed no acute findings on head CT and a urinary tract infection which was treated. Her brother states that she's been having a mild short term memory issues. She is no longer experiencing altered mental status. She is here today for neurologic evaluation.  Followup December 14, 2021:: This is a 66-year-old woman who presents today for neurologic followup. She has a history of stroke that has left her with residual dysarthria. She also has mild left-sided weakness. She at last visit was complaining of memory issues. She finds that this is stable and her brother agrees. She is currently taking a daily aspirin, 325 mg. She is keeping control her blood pressure and cholesterol. She is here today for neurologic followup.  Follow-up March 15, 2022: This is a 66-year-old woman who presents today for follow-up of stroke as well as memory loss.  Regarding her stroke she is fairly stable she has some dysarthria and mild left-sided weakness which is at her baseline.  She also was complaining of memory loss.  This is stable.  This may represent more of a vascular issue rather than a primary neurodegenerative dementia.  She is here today routine neurologic follow-up, remaining on medications for blood pressure cholesterol as well as aspirin.  Follow-up January 13, 2023: This is a 67-year-old woman who presents today with history of stroke.  She also has some mild memory loss and expressive language issues.  She has a left-sided weakness and dysarthria as a result of her stroke.  She is stable from this.  She has more issues today with expressing herself.  Time she feels she cannot get the words out right.  She is here today for neurologic follow-up.  Follow-up October 4, 2023: This is a 68-year-old woman who presents today with history of stroke and mild memory loss.  She is left with aphasia due to her stroke.  She also has left-sided weakness and dysarthria.  She is overall stable.  She did try speech therapy but did not like it and did not participate as well as she could have likely due to her dislike of the therapy.  She had a fall in the shower injuring her right leg and is in wound care now.  She had a CAT scan of her head after the fall on August 25, 2023 which did not show any acute intracranial pathology.  She is here today for routine neurologic follow-up.  Follow-up April 4, 2024: This is a 68-year-old woman who presents today with history of stroke and mild memory loss.  Her brother accompanies her.  He states that her memory is fairly stable with some episodes of confusion at times.  Her aphasia is also stable.  She continues to have left-sided weakness and dysarthria.  She does not have any new medical issues that she reports.  She states that she goes for regular primary care doctor visits blood work is taken every 3 months and has been okay.  She is here today for neurologic follow-up.

## 2024-09-23 ENCOUNTER — OFFICE (OUTPATIENT)
Dept: URBAN - METROPOLITAN AREA CLINIC 104 | Facility: CLINIC | Age: 69
Setting detail: OPHTHALMOLOGY
End: 2024-09-23
Payer: MEDICARE

## 2024-09-23 DIAGNOSIS — H25.13: ICD-10-CM

## 2024-09-23 DIAGNOSIS — H01.002: ICD-10-CM

## 2024-09-23 DIAGNOSIS — H01.001: ICD-10-CM

## 2024-09-23 DIAGNOSIS — H01.004: ICD-10-CM

## 2024-09-23 PROCEDURE — 92012 INTRM OPH EXAM EST PATIENT: CPT | Performed by: OPTOMETRIST

## 2024-09-23 PROCEDURE — 92015 DETERMINE REFRACTIVE STATE: CPT | Performed by: OPTOMETRIST

## 2024-09-23 ASSESSMENT — LID EXAM ASSESSMENTS
OS_BLEPHARITIS: LLL LUL 1+
OD_BLEPHARITIS: RLL RUL 1+

## 2024-09-23 ASSESSMENT — CONFRONTATIONAL VISUAL FIELD TEST (CVF)
OD_FINDINGS: FULL
OS_FINDINGS: FULL

## 2024-10-15 ENCOUNTER — APPOINTMENT (OUTPATIENT)
Dept: NEUROLOGY | Facility: CLINIC | Age: 69
End: 2024-10-15
Payer: MEDICARE

## 2024-10-15 VITALS
HEIGHT: 66 IN | SYSTOLIC BLOOD PRESSURE: 111 MMHG | WEIGHT: 195 LBS | DIASTOLIC BLOOD PRESSURE: 66 MMHG | HEART RATE: 61 BPM | BODY MASS INDEX: 31.34 KG/M2

## 2024-10-15 DIAGNOSIS — E78.5 HYPERLIPIDEMIA, UNSPECIFIED: ICD-10-CM

## 2024-10-15 DIAGNOSIS — I63.9 CEREBRAL INFARCTION, UNSPECIFIED: ICD-10-CM

## 2024-10-15 PROCEDURE — 99213 OFFICE O/P EST LOW 20 MIN: CPT

## 2024-10-15 PROCEDURE — G2211 COMPLEX E/M VISIT ADD ON: CPT

## 2025-04-15 ENCOUNTER — APPOINTMENT (OUTPATIENT)
Dept: NEUROLOGY | Facility: CLINIC | Age: 70
End: 2025-04-15